# Patient Record
Sex: FEMALE | Race: WHITE
[De-identification: names, ages, dates, MRNs, and addresses within clinical notes are randomized per-mention and may not be internally consistent; named-entity substitution may affect disease eponyms.]

---

## 2017-07-11 ENCOUNTER — HOSPITAL ENCOUNTER (OUTPATIENT)
Dept: HOSPITAL 47 - WWCWWP | Age: 37
End: 2017-07-11
Payer: MEDICAID

## 2017-07-11 DIAGNOSIS — Z01.419: Primary | ICD-10-CM

## 2017-07-12 NOTE — [facility]
DATE OF SERVICE:  07/11/2017



CHIEF COMPLAINT:  The patient is here for her routine gynecologic exam. 



HPI:  This is a 36-year-old G1, P1 with an LMP of 7/7/17. The patient has a 
ParaGard IUD in place since 2012.  She states she has been having worsening 
moodiness especially during the one week prior to each period.  She also can 
feel very angry at those times. She has also noticed her periods are heavier 
with the ParaGard IUD.  She states she has had similar symptoms in the past and 
had used oral contraceptives even while on the ParaGard IUD.  She would like to 
go back on oral contraceptives for these symptoms. She had previously used 
Prozac for similar symptoms, but did not do well with this. She also used 
Wellbutrin, which caused her to be very tired.  She had used the Mirena IUD in 
the past, but this caused frequent yeast infections.  She feels strongly that 
she would like to use oral contraceptives, but continue with the ParaGard IUD 
in place. She states Triphasic oral contraceptives seem to make symptoms worse 
and would like to go on a monophasic pill. 



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  
She denies any other medical problems. 



MEDICATIONS:  

1. Ventolin inhaler p.r.n.. 

2. Multivitamin daily.  

3. Tums 1 daily. 

4.  Vitamin D supplement 5000 units daily, which she only takes during the 
winter. 

5.  Eleele Wort daily. 



ALLERGIES:  No known drug allergies. 



PAST SURGICAL HISTORY:  Surgery for a patent ductus arteriosus at 2 weeks of 
age. 



PAST GYN HISTORY:  Menses are regular every 35 days and she has not history of 
STDs.  



SOCIAL HISTORY:  She denies tobacco and drug use and has 4 to 5 alcoholic 
drinks per week. She has been  since 2012 and is a pharmacy technician 
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 



FAMILY HISTORY:  Grandmother had TIAs. Grandfather had lung cancer and heart 
disease and her other grandfather had diabetes. 



REVIEW OF SYSTEMS:  She has gained about 7 pounds over the last year. She 
denies respiratory or cardiac problems. 

GI:  She has had some constipation, which is not new for her.  



PHYSICAL EXAM:  Blood pressure is 106/71. Height 5 feet 5 inches. Weight 117 
pounds. Temperature 98.4. Pulse is 92. This is a well-developed, well-nourished 
white female who is alert and oriented x3 in no acute distress. 

HEENT is within normal limits. 

NECK:  Supple without mass or thyromegaly.  

CHEST AND LUNGS:  Clear to auscultation. 

HEART:  Regular rate and rhythm.

Breasts are without mass or discharge. 

Axillary exam is negative for adenopathy. 

BACK:  Negative for CVA tenderness. 

ABDOMEN:  Soft, nontender without palpable masses. 

PELVIC EXAM:  Normal external genitalia .Cervix and vagina reveal a small 
amount of menstrual blood in the vagina consistent with her menses. The cervix 
appears normal. The IUD string protrudes from the cervix about 1 cm. There is 
no cervical motion tenderness.  The uterus is mid position, nongravid size and 
nontender.  There are no palpable adnexal masses or tenderness. 

Rectal exam was refused by the patient. 

EXTREMITIES:  Nontender.  



IMPRESSION: 

1. A 36-year-old female with normal gynecologic exam. 

2 . ParaGard IUD is in place. 

3.  Premenstrual symptoms consisting of moodiness and feelings of anger. 

4.  Mild hypermenorrhea. 



PLAN: 

1.  Pap smear was deferred, since she had a normal one less than 2 years ago. 

2.  Self-breast examination was discussed. 

3.  We have discussed her symptoms and we have discussed options including 
meclofenamate sodium and SSRI types of medications.  She would like to go back 
on oral contraception.  We have discussed risks of oral contraception including 
increased risk for blood clots. She understands these things and would like to 
start on monophasic birth control pill.  A prescription for Ortho-Cyclen was 
given to the patient and she will start this today and use as directed. 

4.  She will return in one year and p.r.n. 
MTDD

## 2018-07-17 ENCOUNTER — HOSPITAL ENCOUNTER (OUTPATIENT)
Dept: HOSPITAL 47 - WWCWWP | Age: 38
End: 2018-07-17
Payer: MEDICAID

## 2018-07-17 VITALS — TEMPERATURE: 98 F | SYSTOLIC BLOOD PRESSURE: 121 MMHG | DIASTOLIC BLOOD PRESSURE: 84 MMHG | HEART RATE: 77 BPM

## 2018-07-17 VITALS — BODY MASS INDEX: 44.4 KG/M2

## 2018-07-17 DIAGNOSIS — Z53.9: Primary | ICD-10-CM

## 2018-07-17 NOTE — P.HPOB
History of Present Illness


H&P Date: 18


Chief Complaint: The patient is here for her routine gynecologic exam.


This is a 37-year-old  with an LMP of 2018.  She has a ParaGard IUD 

in place since 2012.  She did use birth control pills for most of the year 

because of various symptoms including premenstrual moodiness and heavy menses.  

She stopped the birth control pill last month because of breast soreness and 

vaginal dryness.  She is complaining of moodiness the week before her menses.  

This improves during her menstrual flow, but she feels depressed after her 

menses has ended.  She previously was on Cymbalta and later changed to Effexor.

  She felt ill when she missed pills and when she tried to stop the effects are 

in the past.  She has seen a psychologist in the past.  She denies any current 

suicidal ideation, however, she states the depression can get pretty bad at 

times.  She is otherwise without complaints.








Review of Systems





The patient has gained 4 pounds over the last year.  She denies respiratory or 

cardiac problems.  G.I.: occasional constipation.





Past Medical History


Additional Past Medical History / Comment(s): Mild emphysema related to being 

born prematurely. Past GYN history: she has a ParaGard IUD in place.  She has 

no history of STDs.


History of Any Multi-Drug Resistant Organisms: None Reported


Additional Past Surgical History / Comment(s): Surgery for patent ductus 

arteriosus at 2 weeks of age.


Past Psychological History: Anxiety, Depression


Smoking Status: Never smoker


Past Alcohol Use History: Occasional (1 every other day)


Past Drug Use History: None Reported


Additional History: She has been  since  and is a pharmacy 

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





- Past Family History


  ** Mother


Family Medical History: No Reported History


Additional Family Medical History / Comment(s): Grandfather had flung and 

pancreatic cancer along with heart disease.





Medications and Allergies


 Home Medications











 Medication  Instructions  Recorded  Confirmed  Type


 


Multivit with Calcium,Iron,Min 1 each PO DAILY 09/18/15 07/17/18 History





[Women's Daily Multivitamin]    


 


Анна's Wort 300 mg PO TID 18 History











 Allergies











Allergy/AdvReac Type Severity Reaction Status Date / Time


 


No Known Allergies Allergy   Verified 18 11:46














Exam


 Vital Signs











  Temp Pulse BP


 


 07/17/18 11:46  98.0 F  77  121/84








 Intake and Output











 18





 22:59 06:59 14:59


 


Other:   


 


  Weight   121 kg











Height 5'5", correction: weight 121 pounds, BMI 20.1.





This is a well-developed well-nourished white female who is alert and oriented 

times 3 in no acute distress.


HEENT: Within normal limits.


NECK: Supple without mass or thyromegaly.


CHEST AND LUNGS: Clear to auscultation.


HEART: Regular rate and rhythm.


BREASTS: Are without mass or discharge.


AXILLARY EXAM: Negative for adenopathy.


BACK: Negative for CVA tenderness.


ABDOMEN: Soft, nontender, without palpable masses.


PELVIC EXAM: Normal external genitalia. Cervix and vagina appear normal with a 

white IUD stream protruding approximately 1 cm from the cervical office. There 

is no unusual discharge.  There is no evidence of prolapse.  The uterus is 

midposition, nongravid size and nontender. There are no palpable adnexal masses 

or tenderness.


RECTAL EXAM: refused by the patient.


EXTREMITIES: Nontender.





IMPRESSION: 


1.  37 year old female with normal gynecologic exam with guard IUD in place.


2.  Pre-menstrual moodiness and post menstrual depression.  The patient did not 

have significant improvement with oral contraception.  The depression can be 

moderate at times.


3.  Mild hypermenorrhea with the ParaGard IUD.





PLAN: 


1.  Pap smear was performed.


2.  Breast awareness was discussed.


3.  We have had a long discussion regarding her moodiness and depressed 

feelings.  She will be referred to Dr. Echeverria , the psychiatrist, for 

further evaluation and possible treatment with medications.  She was instructed 

to call or seek immediate help if she's having worsening symptoms or any 

suicidal ideation.


4.  We can consider trial of meclofenamate sodium if she is having issues with 

hypermenorrhea with the ParaGard IUD.


5.  She will return in one year and PRN.

## 2018-07-25 NOTE — P.PN
Progress Note - Text


Progress Note Date: 07/25/18





OUTPATIENT FOLLOW-UP NOTE





TEST(S)/RESULTS: Pap smear from 07/17/2018 was negative.


METHOD OF NOTIFICATION: she was notified by phone


PATIENT COMMENTS: patient understands this result.


DIAGNOSIS: negative Pap smear


DISCUSSION: we also discussed how we were unable to make a referral to Dr. Echeverria's office since he is not accepting new patients at this time.  She 

was given the names of 3 different mental health/counseling centers including 

[facility], [facility], and [facility].  The patient states she will be calling one of these to be seen.  

She was instructed to call if she has any problems getting in to one of these 

places.  She initially sent a request to have her prescription for oral 

contraceptives refilled prior to being seen on 07/17/2018.  She was using oral 

contraceptives for cyclic mood changes.  She understands that we will not be 

using oral contraceptives for this at this time, but I would like her to be 

seen for her mental health concerns.


PLAN: the patient will be making an appointment for mental health counseling 

and care.  She will also return in one year and PRN

## 2019-02-11 ENCOUNTER — HOSPITAL ENCOUNTER (OUTPATIENT)
Dept: HOSPITAL 47 - LABPAT | Age: 39
Discharge: HOME | End: 2019-02-11
Attending: OBSTETRICS & GYNECOLOGY
Payer: MEDICAID

## 2019-02-11 DIAGNOSIS — Z01.812: Primary | ICD-10-CM

## 2019-02-11 DIAGNOSIS — Z64.0: ICD-10-CM

## 2019-02-11 LAB
BASOPHILS # BLD AUTO: 0.1 K/UL (ref 0–0.2)
BASOPHILS NFR BLD AUTO: 1 %
EOSINOPHIL # BLD AUTO: 0.2 K/UL (ref 0–0.7)
EOSINOPHIL NFR BLD AUTO: 3 %
ERYTHROCYTE [DISTWIDTH] IN BLOOD BY AUTOMATED COUNT: 4.63 M/UL (ref 3.8–5.4)
ERYTHROCYTE [DISTWIDTH] IN BLOOD: 12.2 % (ref 11.5–15.5)
HCT VFR BLD AUTO: 41.8 % (ref 34–46)
HGB BLD-MCNC: 14.3 GM/DL (ref 11.4–16)
LYMPHOCYTES # SPEC AUTO: 1.3 K/UL (ref 1–4.8)
LYMPHOCYTES NFR SPEC AUTO: 29 %
MCH RBC QN AUTO: 31 PG (ref 25–35)
MCHC RBC AUTO-ENTMCNC: 34.3 G/DL (ref 31–37)
MCV RBC AUTO: 90.2 FL (ref 80–100)
MONOCYTES # BLD AUTO: 0.4 K/UL (ref 0–1)
MONOCYTES NFR BLD AUTO: 9 %
NEUTROPHILS # BLD AUTO: 2.6 K/UL (ref 1.3–7.7)
NEUTROPHILS NFR BLD AUTO: 56 %
PLATELET # BLD AUTO: 225 K/UL (ref 150–450)
WBC # BLD AUTO: 4.6 K/UL (ref 3.8–10.6)

## 2019-02-11 PROCEDURE — 85025 COMPLETE CBC W/AUTO DIFF WBC: CPT

## 2019-02-22 NOTE — HP
HISTORY AND PHYSICAL



DATE OF SERVICE:

2019



HISTORY:

This is a 38-year-old  1, para 1 woman who desires permanent sterility.  She

currently has an intrauterine device and would like to have this removed and undergo

tubal sterilization.  She is scheduled to undergo bilateral tubal ligation with Filshie

clip application.  She also has a large left flank lipoma that will be removed at the

same time.



ALLERGIES:

None.



MEDICATIONS:

1. ParaGard intrauterine device.

2. Symbicort.

3. Zoloft.



PAST MEDICAL HISTORY:

Asthma and anxiety.



PAST SURGICAL HISTORY:

Heart valve repair in .



OB/GYN PAST HISTORY:

She is a  1, para 1, with a history of 1 normal spontaneous vaginal delivery.

She currently has a ParaGard IUD in place.  No history of abnormal Pap smears or STDs.



FAMILY HISTORY:

Noncontributory.



SOCIAL HISTORY:

She is .  Negative tobacco, alcohol, and drug use.



REVIEW OF SYSTEMS:

She has regular once per month menses.  She denies fevers, chills, nausea, vomiting,

diarrhea, constipation, shortness of breath, chest pain, palpitations.



PHYSICAL EXAM:

Weight 124 pounds, height 5 feet 5 inches, blood pressure 110/80.

HEENT exam is unremarkable with no palpable lymphadenopathy or thyromegaly.  The heart

is a regular rate and rhythm with no detectable murmur.  The lungs are clear to

auscultation bilaterally.  She has an approximately 5 x 5 cm left mid axillary soft

tissue mass on pelvic examination. She has normal female external genitalia without

lesions or irritation.  On bimanual examination, the uterus is small, freely mobile and

in the midline with no adnexal abnormalities.  IUD strings are present at the cervical

os.



ASSESSMENT:

This is a 38-year-old  1, para 1 woman who desires permanent sterility.  We have

discussed options for contraception in detail and she requests IUD removal.  She

understands the risks associated with laparoscopic tubal ligation, including bleeding,

laparotomy, infection, damage to bowel, bladder, ureters and/or other intrapelvic or

intraabdominal structures, anesthesia complications, risk of failure of the tubal

ligation and/or ectopic pregnancy.

IUD will be removed at the time.  She understands these risks, consent is obtained and

she agrees to proceed.  She is scheduled on 2019.





MMODL / IJN: [id number] / Job#: [id number]

## 2019-02-26 ENCOUNTER — HOSPITAL ENCOUNTER (OUTPATIENT)
Dept: HOSPITAL 47 - OR | Age: 39
Discharge: HOME | End: 2019-02-26
Attending: SURGERY
Payer: MEDICAID

## 2019-02-26 VITALS — BODY MASS INDEX: 20.7 KG/M2

## 2019-02-26 VITALS — TEMPERATURE: 97.3 F

## 2019-02-26 VITALS — SYSTOLIC BLOOD PRESSURE: 104 MMHG | HEART RATE: 98 BPM | DIASTOLIC BLOOD PRESSURE: 66 MMHG

## 2019-02-26 VITALS — RESPIRATION RATE: 16 BRPM

## 2019-02-26 DIAGNOSIS — R19.00: ICD-10-CM

## 2019-02-26 DIAGNOSIS — Z30.2: Primary | ICD-10-CM

## 2019-02-26 DIAGNOSIS — Z97.5: ICD-10-CM

## 2019-02-26 DIAGNOSIS — D17.1: ICD-10-CM

## 2019-02-26 DIAGNOSIS — J45.909: ICD-10-CM

## 2019-02-26 DIAGNOSIS — Z79.899: ICD-10-CM

## 2019-02-26 DIAGNOSIS — F32.9: ICD-10-CM

## 2019-02-26 DIAGNOSIS — Z79.51: ICD-10-CM

## 2019-02-26 DIAGNOSIS — F41.9: ICD-10-CM

## 2019-02-26 PROCEDURE — 81025 URINE PREGNANCY TEST: CPT

## 2019-02-26 PROCEDURE — 21933 EXC BACK TUM DEEP 5 CM/>: CPT

## 2019-02-26 PROCEDURE — 58671 LAPAROSCOPY TUBAL BLOCK: CPT

## 2019-02-26 PROCEDURE — 88304 TISSUE EXAM BY PATHOLOGIST: CPT

## 2019-02-26 RX ADMIN — HYDROMORPHONE HYDROCHLORIDE PRN MG: 1 INJECTION, SOLUTION INTRAMUSCULAR; INTRAVENOUS; SUBCUTANEOUS at 09:27

## 2019-02-26 RX ADMIN — HYDROMORPHONE HYDROCHLORIDE PRN MG: 1 INJECTION, SOLUTION INTRAMUSCULAR; INTRAVENOUS; SUBCUTANEOUS at 09:21

## 2019-02-26 NOTE — P.OP
Date of Procedure: 02/26/19


Preoperative Diagnosis: 


Left flank lipoma


Postoperative Diagnosis: 


Left flank lipoma


Procedure(s) Performed: 


Excision of lipoma, 4 x 3 cm


Anesthesia: CHACE


Surgeon: Daija Garcia


Pathology: other (Lipoma)


Condition: stable


Disposition: same day


Indications for Procedure: 


38-year-old female presented to the office with complaints of lipoma of the 

left flank.  She is scheduled for tubal ligation with Dr. Koenig and has 

requested for excision of the lipoma at the same time.  The patient was 

explained the risks, benefits and alternatives to the procedure and did provide 

consent prior to attending the operating suite.


Operative Findings: 


Left flank lipoma


Description of Procedure: 


After the gynecologic procedure was completed, the patient was prepped and 

draped in regular sterile fashion with her left flank exposed.  Local 

anesthetic was administered and an incision was made over the palpable lipoma 

site.  Dissection was carried towards the lipoma.  The lipoma was known to be 

submuscular and muscle was incised to evacuate the lipoma.  Once the lipoma was 

clearly visualized and was removed in its entirety.  Hemostasis was maintained.

  The muscular layer was then closed with interrupted Vicryl suture.  Skin was 

then closed with multiple 4-0 Vicryl subcuticular suture.  Sterile dressing was 

applied.  The patient was awakened in the operating suite and taken to 

postanesthesia care unit in stable condition.

## 2019-02-26 NOTE — P.OP
Date of Procedure: 02/26/19


Preoperative Diagnosis: 


Undesired fertility


Postoperative Diagnosis: 


Same


Procedure(s) Performed: 


Laparoscopic bilateral tubal ligation with Filshie clips


Anesthesia: CHACE


Surgeon: Rosemarie Koenig


Estimated Blood Loss (ml): 5


Urine output (ml): 25


Pathology: none sent


Condition: stable


Indications for Procedure: 


Undesired fertility


Operative Findings: 


Normal-appearing bilateral fallopian tubes and ovaries, grossly normal upper 

abdomen to inspection


Description of Procedure: 


After the patient was met in the preoperative holding area and all questions 

were answered, she was taken to the operating room where anesthetic was 

administered without incident.  She is in positioned, prepped and draped in the 

low lithotomy position.  Bladder was drained for approximately 25 mL of clear 

urine.  Speculum was placed in the vagina and the cervix was grasped anteriorly 

with a single-tooth tenaculum.  [place] uterine manipulator was placed.  

Attention was then turned to the abdomen and gloves were changed.  A 5 mm skin 

incision was made immediately superior to the umbilicus.  The anterior 

abdominal wall was grasped and elevated.  The Veress needle was introduced.  

Saline drop test indicated intraperitoneal placement.  Initial filling pressure 

was 0 mm.  The abdomen was then insufflated to a total filling pressure of 15 

mm.  The Veress needle was removed and the optical blade less 5 mm trocar was 

introduced.  Camera was introduced under direct visualization.  Under direct 

visualization a suprapubic 8 mm port was placed.  The patient was then placed 

in Trendelenburg and the bowel was swept out of the pelvis with a blunt probe.  

The uterus, fallopian tubes and ovaries were inspected.  The Filshie clip 

applier was introduced.  The right fallopian tube was identified and carried 

out visually to the fimbriated end.  Filshie clip was then applied completed 

transecting the fallopian tube in the mid ampullary portion.  Similarly the 

left fallopian tube was identified and visually carried out to fimbriated end.  

Filshie clip was then utilized to completely transect the tube in the mid 

ampullary portion.  Visual inspection again noted appropriate placement of 

Filshie clips.  Instruments were then removed and the abdomen was desufflated 

of CO2 gas.  4-0 Monocryl was then utilized to close each of the skin incisions 

and both were infused with 2 mL's of Sensorcaine.  A uterine manipulator was 

then removed.  Dressings were applied.  The case was then turned over to Dr. Garcia for removal of left flank lipoma.

## 2021-07-03 ENCOUNTER — HOSPITAL ENCOUNTER (EMERGENCY)
Dept: HOSPITAL 47 - EC | Age: 41
Discharge: HOME | End: 2021-07-03
Payer: MEDICAID

## 2021-07-03 VITALS — SYSTOLIC BLOOD PRESSURE: 98 MMHG | RESPIRATION RATE: 16 BRPM | HEART RATE: 78 BPM | DIASTOLIC BLOOD PRESSURE: 67 MMHG

## 2021-07-03 VITALS — TEMPERATURE: 97.9 F
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
## 2021-07-03 DIAGNOSIS — F32.9: ICD-10-CM

## 2021-07-03 DIAGNOSIS — J43.9: ICD-10-CM

## 2021-07-03 DIAGNOSIS — S93.601A: Primary | ICD-10-CM

## 2021-07-03 DIAGNOSIS — F41.9: ICD-10-CM

## 2021-07-03 DIAGNOSIS — Z79.51: ICD-10-CM

## 2021-07-03 DIAGNOSIS — Z79.1: ICD-10-CM

## 2021-07-03 DIAGNOSIS — R11.0: ICD-10-CM

## 2021-07-03 PROCEDURE — 73562 X-RAY EXAM OF KNEE 3: CPT

## 2021-07-03 PROCEDURE — 96374 THER/PROPH/DIAG INJ IV PUSH: CPT

## 2021-07-03 PROCEDURE — 29515 APPLICATION SHORT LEG SPLINT: CPT

## 2021-07-03 PROCEDURE — 99284 EMERGENCY DEPT VISIT MOD MDM: CPT

## 2021-07-03 PROCEDURE — 72050 X-RAY EXAM NECK SPINE 4/5VWS: CPT

## 2021-07-03 PROCEDURE — 73610 X-RAY EXAM OF ANKLE: CPT

## 2021-07-03 PROCEDURE — 73630 X-RAY EXAM OF FOOT: CPT

## 2021-07-03 RX ADMIN — ONDANSETRON STA MG: 2 INJECTION INTRAMUSCULAR; INTRAVENOUS at 18:34

## 2021-07-03 RX ADMIN — ACETAMINOPHEN STA MG: 500 TABLET ORAL at 19:26

## 2021-07-03 NOTE — ED
General Adult HPI





- General


Chief complaint: Fall


Stated complaint: Fall from ladder


Time Seen by Provider: 07/03/21 17:55


Source: patient, EMS, RN notes reviewed


Mode of arrival: EMS


Limitations: no limitations





- History of Present Illness


Initial comments: 





Patient is a pleasant 40-year-old female presenting to the emergency department 

from a fall.  Incident occurred prior to arrival.  Patient was on a ladder, 

approximately 6 feet up when the ladder tipped and fell towards the left.  

Patient's right foot got caught in the wrong.  Patient is having discomfort 

mostly of her right foot.  Discomfort was 7/10 however discomfort now is only 

2/10 following medication by EMS.  Patient does have some nausea.  No head 

injury or loss of consciousness.  No neck or back pain.  No chest pain or 

dyspnea.  No abdominal pain.  Patient does have some discomfort of both of her 

knees.





- Related Data


                                Home Medications











 Medication  Instructions  Recorded  Confirmed


 


Multivit with Calcium,Iron,Min 1 each PO DAILY 09/18/15 02/21/19





[Women's Daily Multivitamin]   


 


Budesonide-Formot 160-4.5 Mcg 2 puff INHALATION BID PRN 02/21/19 02/21/19





[Symbicort 160-4.5 Mcg Inhaler]   


 


Cephalexin [Keflex] 500 mg PO Q12HR 02/21/19 02/21/19


 


Sertraline [Zoloft] 50 mg PO DAILY 02/21/19 02/21/19








                                  Previous Rx's











 Medication  Instructions  Recorded


 


Ibuprofen [Motrin] 600 mg PO Q6HR PRN #20 tab 07/03/21











                                    Allergies











Allergy/AdvReac Type Severity Reaction Status Date / Time


 


No Known Allergies Allergy   Verified 02/21/19 14:57














Review of Systems


ROS Statement: 


Those systems with pertinent positive or pertinent negative responses have been 

documented in the HPI.





ROS Other: All systems not noted in ROS Statement are negative.


Constitutional: Denies: fever


Eyes: Denies: eye pain


ENT: Denies: ear pain


Respiratory: Denies: cough


Cardiovascular: Denies: chest pain


Endocrine: Denies: fatigue


Gastrointestinal: Denies: abdominal pain


Genitourinary: Denies: dysuria


Musculoskeletal: Reports: as per HPI


Skin: Denies: rash


Neurological: Denies: weakness





Past Medical History


Past Medical History: No Reported History


Additional Past Medical History / Comment(s): Mild emphysema related to being 

born prematurely. Past GYN history: she has a ParaGard IUD in place.  She has no

 history of STDs.


History of Any Multi-Drug Resistant Organisms: None Reported


Past Surgical History: Coronary Bypass/CABG


Additional Past Surgical History / Comment(s): Surgery for patent ductus 

arteriosus at 2 weeks of age.


Past Psychological History: Anxiety, Depression


Smoking Status: Never smoker


Past Alcohol Use History: Occasional


Past Drug Use History: None Reported





- Past Family History


  ** Mother


Family Medical History: No Reported History


Additional Family Medical History / Comment(s): Grandfather had flung and 

pancreatic cancer along with heart disease.





General Exam


Limitations: no limitations


General appearance: alert, in no apparent distress


Head exam: Present: atraumatic, normocephalic


Eye exam: Present: normal appearance, PERRL


ENT exam: Present: normal oropharynx


Neck exam: Present: normal inspection.  Absent: tenderness


Respiratory exam: Present: normal lung sounds bilaterally


Cardiovascular Exam: Present: regular rate, normal rhythm


  ** Expanded


Peripheral pulses: 2+: Posterior Tibialis (R), Dorsalis Pedis (R)


GI/Abdominal exam: Present: soft.  Absent: tenderness


Extremities exam: Present: other (Moderate tenderness bilateral anterior knees 

do the patella.  Severe tenderness right proximal foot.  Minimal tenderness 

right ankle.  Distal extremities are neurovascularly intact.)


Back exam: Present: normal inspection.  Absent: tenderness, vertebral tenderness


Neurological exam: Present: alert, oriented X3, CN II-XII intact.  Absent: motor

 sensory deficit


Psychiatric exam: Present: normal affect, normal mood


Skin exam: Present: normal color





Course


                                   Vital Signs











  07/03/21





  17:55


 


Temperature 97.9 F


 


Pulse Rate 102 H


 


Respiratory 18





Rate 


 


Blood Pressure 127/80


 


O2 Sat by Pulse 100





Oximetry 














Procedures





- Orthopedic Splinting/Casting


  ** Injury #1


Side: right


Lower Extremity Injury Location: short leg


Lower Extremity Immobilizer: posterior splint





Medical Decision Making





- Medical Decision Making





Patient reevaluated and updated.  Splint placed.





- Radiology Data


Radiology results: image reviewed (Cervical spine x-ray, bilateral knee x-ray, 

right ankle and right foot x-ray revealed no acute traumatic process)





Disposition


Clinical Impression: 


 Fall, Foot sprain





Disposition: HOME SELF-CARE


Condition: Stable


Instructions (If sedation given, give patient instructions):  Foot Sprain (ED)


Additional Instructions: 


Please follow-up with primary care physician in the next day or 2 for recheck.  

If symptoms continue consider orthopedic follow-up as well, number given.  Use 

crutches provided.  Ice to affected area, at least 5 or 6 times daily for 20 

minutes for the next 2-3 days.  Prescription for Motrin 600s and to pharmacy.  

Return for increased pain, swelling, worsening symptoms or other concerns.


Prescriptions: 


Ibuprofen [Motrin] 600 mg PO Q6HR PRN #20 tab


 PRN Reason: Pain


Is patient prescribed a controlled substance at d/c from ED?: No


Referrals: 


Esequiel Prince DO [Primary Care Provider] - 1-2 days


DIOMEDES Harding DO [Doctor of Osteopathic Medicine] - 1-2 days


Time of Disposition: 19:13

## 2021-07-03 NOTE — XR
EXAMINATION TYPE: XR ankle complete RT

 

DATE OF EXAM: 7/3/2021

 

COMPARISON: NONE

 

HISTORY: Pain

 

TECHNIQUE: 3 views

 

FINDINGS: Ankle mortise is anatomic. I see no fracture nor dislocation. Joint spaces are normal.

 

IMPRESSION: Negative right ankle exam.

## 2021-07-03 NOTE — XR
EXAMINATION TYPE: XR cervical spine trauma

 

DATE OF EXAM: 7/3/2021

 

COMPARISON: NONE

 

HISTORY: Neck pain

 

TECHNIQUE: 7 views

 

FINDINGS: Vertebra have normal alignment. Posterior elements are intact. There are no cervical ribs. 
Neuroforamina are widely patent. The lateral axial facet joint is normal. There are no cervical ribs.


 

IMPRESSION: Negative cervical spine exam. No fracture.

## 2021-07-03 NOTE — XR
EXAMINATION TYPE: XR knee complete bilateral

 

DATE OF EXAM: 7/3/2021

 

COMPARISON: NONE

 

HISTORY: Knee pain

 

TECHNIQUE: 6 views

 

FINDINGS: I see no fracture nor dislocation. Joint spaces appear normal. Knee joints have normal alig
nment. There is soft tissue swelling anterior to the left patella tendon. There is no sign of joint e
ffusion.

 

IMPRESSION: Left side soft tissue swelling. No fracture.

## 2021-07-08 ENCOUNTER — HOSPITAL ENCOUNTER (OUTPATIENT)
Age: 41
Discharge: HOME | End: 2021-07-08
Payer: MEDICAID

## 2021-07-14 ENCOUNTER — HOSPITAL ENCOUNTER (OUTPATIENT)
Dept: HOSPITAL 47 - RADXRMAIN | Age: 41
Discharge: HOME | End: 2021-07-14
Attending: FAMILY MEDICINE
Payer: MEDICAID

## 2021-07-14 DIAGNOSIS — R91.8: Primary | ICD-10-CM

## 2021-07-14 PROCEDURE — 71046 X-RAY EXAM CHEST 2 VIEWS: CPT

## 2021-07-14 NOTE — XR
EXAMINATION TYPE: XR chest 2V

 

DATE OF EXAM: 7/14/2021

 

COMPARISON: 7/8/2021

 

HISTORY: Abnormal findings on previous chest x-ray

 

TECHNIQUE:  Frontal and lateral views of the chest are obtained.

 

FINDINGS:  There is no focal air space opacity, pleural effusion, or pneumothorax seen.  The cardiac 
silhouette size is within normal limits.   The osseous structures are intact.

 

IMPRESSION:  No acute cardiopulmonary process.

## 2022-09-16 ENCOUNTER — HOSPITAL ENCOUNTER (OUTPATIENT)
Dept: HOSPITAL 47 - RADUSWWP | Age: 42
Discharge: HOME | End: 2022-09-16
Attending: FAMILY MEDICINE
Payer: MEDICAID

## 2022-09-16 DIAGNOSIS — Z12.31: Primary | ICD-10-CM

## 2022-09-16 DIAGNOSIS — T19.3XXA: ICD-10-CM

## 2022-09-16 PROCEDURE — 77067 SCR MAMMO BI INCL CAD: CPT

## 2022-09-16 PROCEDURE — 76830 TRANSVAGINAL US NON-OB: CPT

## 2022-09-16 PROCEDURE — 76856 US EXAM PELVIC COMPLETE: CPT

## 2022-09-16 NOTE — US
EXAMINATION TYPE: US pelvis complete transvag

 

DATE OF EXAM: 9/16/2022

 

COMPARISON: NONE

 

CLINICAL HISTORY: R10.2 PELVIC AND PERINEAL PAIN.  Patient states she had 2 IUD's in the past that ha
ve been removed but since her tubal ligation 4 years ago, she has had pelvic pain

 

TECHNIQUE:  TA/TV.  Transabdominal sonographic images of the pelvis were acquired.  Transvaginal sono
graphic images 

 

Date of LMP:  08/19/2022

 

EXAM MEASUREMENTS:

 

Uterus:  9.5 x 4.9 x 4.1  cm

Endometrial Stripe: 1.0 cm

Right Ovary:  3.9 x 2.1 x 1.5 cm

Left Ovary:  2.8 x 3.0 x 2.0 cm

 

 

 

1. Uterus:  antegrade, wnl

2. Endometrium:  IUD seen within fundus, patient still states it was removed

3. Right Ovary:  wnl

4. Left Ovary:  wnl

5. Bilateral Adnexa:  wnl

6. Posterior cul-de-sac:  wnl

 

 

 

IMPRESSION: 

1. Pelvic ultrasound appears unremarkable.

2. Note is made of an linear echogenic foreign body within the uterus which could be compatible with 
an IUD. Patient reports IUD has been removed.

## 2022-09-20 NOTE — MM
Reason for Exam: Screening  (asymptomatic). 





Patient History: 

Menarche at age 15. First Full-Term Pregnancy at age 31. Late child-bearing (after 31).

Last menstrual period: 08/18/2022





Risk Values: 

Alee 5 year model risk: 0.7%.

NCI Lifetime model risk: 12.5%.





Tissue Density: 

The breast tissue is extremely dense which could obscure a lesion on mammography.





Findings: 

Analyzed By CAD. 

Asymmetry seen on left MLO view posterior depth measuring up to 13 mm. 





Overall Assessment: Incomplete: need additional imaging evaluation, BI-RAD 0





Management: 

Diagnostic Mammogram of the left breast.

A clinical breast exam by your physician is recommended on an annual basis and results should be

correlated with mammographic findings.



Electronically signed and approved by: Daryn Sharif DO

## 2022-09-26 ENCOUNTER — HOSPITAL ENCOUNTER (OUTPATIENT)
Dept: HOSPITAL 47 - RADMAMWWP | Age: 42
Discharge: HOME | End: 2022-09-26
Attending: FAMILY MEDICINE
Payer: MEDICAID

## 2022-09-26 DIAGNOSIS — R92.8: Primary | ICD-10-CM

## 2022-09-26 PROCEDURE — 77061 BREAST TOMOSYNTHESIS UNI: CPT

## 2022-09-26 PROCEDURE — 77065 DX MAMMO INCL CAD UNI: CPT

## 2022-09-26 NOTE — MM
Reason for Exam: Additional evaluation requested from abnormal screening. 

Last screening mammogram was performed less than 1 month ago.





Patient History: 

Menarche at age 15. First Full-Term Pregnancy at age 31. Late child-bearing (after 31). 





Risk Values: 

Alee 5 year model risk: 0.7%.

NCI Lifetime model risk: 12.5%.





Prior Study Comparison: 

9/16/2022 Bilateral MG screening mammo w CAD, [facility]. 





Tissue Density: 

Left: The breast tissue is heterogeneously dense. This may lower the sensitivity of mammography.





Findings: 

Analyzed By CAD. 

The superior posterior asymmetric density appears to disperse on additional views. Given the

appearance on the screening exam in the lateral priors for comparison, precautionary 6 month

follow-up recommended. 





Overall Assessment: Probably benign, BI-RAD 3





Management: 

Diagnostic Mammogram of the left breast in 6 months.

1. Patient should continue monthly self breast exams.

2. A clinical breast exam by your physician is recommended on an annual basis.

3. This exam should not preclude additional follow-up of suspicious palpable abnormalities.



Electronically signed and approved by: David Osman M.D. Radiologist

## 2023-10-04 ENCOUNTER — HOSPITAL ENCOUNTER (OUTPATIENT)
Dept: HOSPITAL 47 - RADMAMWWP | Age: 43
Discharge: HOME | End: 2023-10-04
Attending: FAMILY MEDICINE
Payer: MEDICAID

## 2023-10-04 DIAGNOSIS — Z12.31: Primary | ICD-10-CM

## 2023-10-04 PROCEDURE — 77067 SCR MAMMO BI INCL CAD: CPT

## 2023-10-04 PROCEDURE — 77063 BREAST TOMOSYNTHESIS BI: CPT

## 2023-10-05 NOTE — MM
Reason for Exam: Screening  (asymptomatic). 

Last mammogram was performed 1 year(s) and 1 month(s) ago. 





Patient History: 

Menarche at age 15. First Full-Term Pregnancy at age 31. Late child-bearing (after 31). 





Risk Values: 

Alee 5 year model risk: 0.8%.

NCI Lifetime model risk: 12.4%.





Prior Study Comparison: 

9/16/2022 Bilateral MG screening mammo w CAD, [facility]. 9/26/2022 Left MG 3D work up w/cad , [facility]. 





Tissue Density: 

The breast tissue is heterogeneously dense. This may lower the sensitivity of mammography.





Findings: 

Analyzed By CAD. 

There is no suspicious group of microcalcifications or new suspicious mass in either breast. 





Overall Assessment: Benign, BI-RAD 2





Management: 

Screening Mammogram of both breasts in 1 year.

.



Patient should continue monthly self-breast exams.  A clinical breast exam by your physician is

recommended on an annual basis.

This exam should not preclude additional follow-up of suspicious palpable abnormalities.



Note on Alee scores and lifetime risk:

1. A Alee score greater than 3% is considered moderate risk. If this is the case, consider

specialist referral to assess eligibility for a risk reducing agent.

2. If overall lifetime risk for the development of breast cancer is 20% or higher, the patient may

qualify for future screening with alternating mammogram and breast MRI.



Electronically signed and approved by: Leopold M. Fregoli, M.D. Radiologis

## 2023-11-10 NOTE — XR
Cassius Burrows,  Please see message below and advise re: request for 90 day supply of Lipitor. Order pended. Thanks, Sera   EXAMINATION TYPE: XR foot complete RT

 

DATE OF EXAM: 7/3/2021

 

COMPARISON: NONE

 

HISTORY: Foot pain

 

TECHNIQUE: 3 views

 

FINDINGS: Metatarsals appear intact. Toes appear intact. I see no fracture nor dislocation. There are
 no erosions.

 

IMPRESSION: Negative right foot exam. No fracture seen.